# Patient Record
Sex: MALE | Race: OTHER | NOT HISPANIC OR LATINO | URBAN - METROPOLITAN AREA
[De-identification: names, ages, dates, MRNs, and addresses within clinical notes are randomized per-mention and may not be internally consistent; named-entity substitution may affect disease eponyms.]

---

## 2022-06-20 ENCOUNTER — HOSPITAL ENCOUNTER (EMERGENCY)
Facility: HOSPITAL | Age: 37
Discharge: HOME | End: 2022-06-20
Attending: FAMILY MEDICINE

## 2022-06-20 VITALS
RESPIRATION RATE: 19 BRPM | OXYGEN SATURATION: 99 % | HEIGHT: 67 IN | SYSTOLIC BLOOD PRESSURE: 140 MMHG | TEMPERATURE: 98 F | BODY MASS INDEX: 28.25 KG/M2 | HEART RATE: 78 BPM | WEIGHT: 180 LBS | DIASTOLIC BLOOD PRESSURE: 81 MMHG

## 2022-06-20 DIAGNOSIS — M79.606 LEG PAIN: Primary | ICD-10-CM

## 2022-06-20 DIAGNOSIS — M79.605 LEFT LEG PAIN: ICD-10-CM

## 2022-06-20 DIAGNOSIS — M79.604 RIGHT LEG PAIN: ICD-10-CM

## 2022-06-20 PROCEDURE — 99284 EMERGENCY DEPT VISIT MOD MDM: CPT | Mod: 25,ER

## 2022-06-20 PROCEDURE — 63600175 PHARM REV CODE 636 W HCPCS: Mod: ER | Performed by: PHYSICIAN ASSISTANT

## 2022-06-20 PROCEDURE — 96372 THER/PROPH/DIAG INJ SC/IM: CPT | Performed by: PHYSICIAN ASSISTANT

## 2022-06-20 RX ORDER — DEXAMETHASONE SODIUM PHOSPHATE 4 MG/ML
8 INJECTION, SOLUTION INTRA-ARTICULAR; INTRALESIONAL; INTRAMUSCULAR; INTRAVENOUS; SOFT TISSUE
Status: COMPLETED | OUTPATIENT
Start: 2022-06-20 | End: 2022-06-20

## 2022-06-20 RX ORDER — NAPROXEN 500 MG/1
500 TABLET ORAL 2 TIMES DAILY WITH MEALS
Qty: 12 TABLET | Refills: 0 | OUTPATIENT
Start: 2022-06-20 | End: 2024-01-16

## 2022-06-20 RX ADMIN — DEXAMETHASONE SODIUM PHOSPHATE 8 MG: 4 INJECTION, SOLUTION INTRA-ARTICULAR; INTRALESIONAL; INTRAMUSCULAR; INTRAVENOUS; SOFT TISSUE at 03:06

## 2022-06-20 NOTE — ED NOTES
Patient reports bilateral leg pain that for 2 months that has not resolved. The patient reports previous lumbar disc issue that caused pain from right lumber down the right leg. Patient reports pain he is experiencing now begins in distal right thigh and radiates to foot. On the left pain is from calf to foot. 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 He denies fall, injury or trauma. No edema or obvious trauma noted. He is awake/alert and ambulatory with mild distress noted at this time.

## 2022-06-20 NOTE — ED PROVIDER NOTES
Encounter Date: 6/20/2022       History     Chief Complaint   Patient presents with    Leg Pain     C/o bilat leg pain ongoing for a couple months. Denies any injury. Right leg pain starts in thigh and goes down leg. Left leg pain is from knee down. States pain wakes him up in his sleep.   Hx of blood clots. Pt is a .      36-year-old male presents emergency department for evaluation of intermittent bilateral leg pain.  He reports that the pain began gradually 3-6 months ago and has been intermittent since onset.  Reports that he is a  and has intermittent pain to his lower legs specially while working.  He reports that he was diagnosed with a DVT 2 years ago and is a on any blood thinning medications.  He does report 6 years ago he was also diagnosed with sciatica.  He denies any numbness, tingling, weakness or swelling to the lower extremities.  Denies any skin changes to the lower extremities.  He denies any direct trauma to his low back.  He denies any saddle anesthesia or bowel/bladder incontinence.  No treatment was attempted at home or prior to arrival today.  He denies any associated symptoms including headache, dizziness, neck pain, chest pain, shortness of breath, palpitations, nausea flank pain or dysuria.  He reports that he is here today over concern of returning DVTs.        Review of patient's allergies indicates:  No Known Allergies  Past Medical History:   Diagnosis Date    DVT (deep venous thrombosis)      History reviewed. No pertinent surgical history.  History reviewed. No pertinent family history.  Social History     Tobacco Use    Smoking status: Never Smoker    Smokeless tobacco: Never Used   Substance Use Topics    Alcohol use: Yes     Comment: occasioanlly    Drug use: Never     Review of Systems   Constitutional: Negative for activity change and appetite change.   HENT: Negative for congestion, rhinorrhea, sinus pressure, sore throat, trouble swallowing and  voice change.    Eyes: Negative for photophobia and visual disturbance.   Respiratory: Negative for cough, chest tightness and shortness of breath.    Gastrointestinal: Negative for abdominal pain, constipation, diarrhea, nausea and vomiting.   Genitourinary: Negative for decreased urine volume and flank pain.   Musculoskeletal: Positive for back pain. Negative for arthralgias, gait problem, joint swelling, neck pain and neck stiffness.   Skin: Negative for rash.   Neurological: Negative for dizziness, syncope, weakness, light-headedness, numbness and headaches.       Physical Exam     Initial Vitals [06/20/22 1347]   BP Pulse Resp Temp SpO2   (!) 142/89 77 19 98.1 °F (36.7 °C) 97 %      MAP       --         Physical Exam    Nursing note and vitals reviewed.  Constitutional: He appears well-developed and well-nourished. He is not diaphoretic. No distress.   HENT:   Head: Normocephalic and atraumatic.   Right Ear: External ear normal.   Left Ear: External ear normal.   Mouth/Throat: Oropharynx is clear and moist. No oropharyngeal exudate.   Eyes: Conjunctivae and EOM are normal. Pupils are equal, round, and reactive to light.   Neck: Neck supple.   Normal range of motion.  Cardiovascular: Normal rate, regular rhythm and normal heart sounds.   Pulmonary/Chest: Breath sounds normal. No respiratory distress. He has no wheezes. He has no rhonchi. He has no rales. He exhibits no tenderness.   Abdominal: Abdomen is soft. Bowel sounds are normal. He exhibits no distension. There is no abdominal tenderness.   No right CVA tenderness.  No left CVA tenderness. There is no guarding.   Musculoskeletal:      Cervical back: Normal range of motion and neck supple. No tenderness or bony tenderness.      Thoracic back: No tenderness or bony tenderness.      Lumbar back: No tenderness or bony tenderness.      Right hip: No tenderness or bony tenderness. Normal range of motion.      Left hip: No tenderness or bony tenderness. Normal  range of motion.      Right upper leg: Tenderness present. No swelling or bony tenderness.      Left upper leg: No swelling or tenderness.      Right knee: No swelling or bony tenderness. No tenderness.      Instability Tests: Medial Sara test negative and lateral Sara test negative.      Left knee: No swelling or bony tenderness. No tenderness.      Instability Tests: Medial Sara test negative and lateral Sara test negative.      Right lower leg: No swelling, tenderness or bony tenderness. No edema.      Left lower leg: Tenderness present. No swelling or bony tenderness. No edema.      Right ankle: No swelling. No tenderness. Normal range of motion.      Right Achilles Tendon: No tenderness.      Left ankle: No swelling. No tenderness. Normal range of motion.      Left Achilles Tendon: No tenderness.      Right foot: Normal range of motion. No tenderness or bony tenderness.      Left foot: Normal range of motion. No tenderness or bony tenderness.        Legs:      Lymphadenopathy:     He has no cervical adenopathy.   Neurological: He is alert and oriented to person, place, and time.   Skin: Skin is warm and dry.   Psychiatric: He has a normal mood and affect.         ED Course   Procedures  Labs Reviewed - No data to display       Imaging Results          US Lower Extremity Veins Bilateral (Final result)  Result time 06/20/22 15:10:01    Final result by Jose M Yuan MD (06/20/22 15:10:01)                 Impression:      No evidence of deep venous thrombosis in either lower extremity.      Electronically signed by: Jose M Yuan  Date:    06/20/2022  Time:    15:10             Narrative:    EXAMINATION:  US LOWER EXTREMITY VEINS BILATERAL    CLINICAL HISTORY:  Pain in leg, unspecified    TECHNIQUE:  Duplex and color flow Doppler and dynamic compression was performed of the bilateral lower extremity veins was performed.    COMPARISON:  None    FINDINGS:  Right thigh veins: The common femoral,  femoral, popliteal, upper greater saphenous, and deep femoral veins are patent and free of thrombus. The veins are normally compressible and have normal phasic flow and augmentation response.    Right calf veins: The visualized calf veins are patent.    Left thigh veins: The common femoral, femoral, popliteal, upper greater saphenous, and deep femoral veins are patent and free of thrombus. The veins are normally compressible and have normal phasic flow and augmentation response.    Left calf veins: The visualized calf veins are patent.    Miscellaneous: None                                 Medications   dexamethasone injection 8 mg (8 mg Intramuscular Given 6/20/22 1503)     Medical Decision Making:   Initial Assessment:   36-year-old male presents emergency department for evaluation of several month history of intermittent leg pain hand and flu back pain.  Physical exam reveals a nontoxic-appearing male in no acute distress.  Patient is afebrile vital signs within normal limits.  Neurological exam feels alert and oriented patient.  Neck is supple, nontender to palpation.  Lungs clear to auscultation bilaterally.  Abdominal exam reveals soft abdomen, nontender to palpation.  No CVA tenderness noted.  No tenderness to palpation noted over the paraspinal musculature of the spinous processes of cervical, thoracic or lumbar spine.  Examination of the lower extremities to mild tenderness to palpation noted over the right buttocks and noted to the posterior aspect of the right proximal thigh.  Mild tenderness to palpation noted over the left calf.  Full range of motion, sensation and peripheral pulses intact in lower extremities bilaterally.  Patient ambulates well without hesitation or gait abnormality.  Differential Diagnosis:   Ultrasound ordered to assess for possible serious etiology including DVT  Sciatica  Muscle strain  I carefully considered but doubt serious etiology including fracture, dislocation or cauda  equina syndrome.  No imaging of the lumbar spine indicated at this time.  ED Management:  Ultrasound reveals no evidence of DVT in either lower extremity.  Patient was given Decadron with complete relief of pain.  He reports that he is currently asymptomatic.  Instructed patient to follow up with his primary care provider for re-evaluation and to return to the emergency department immediately for any new or worsening symptoms.  Patient verbalizes understanding and agreement course of treatment.                      Clinical Impression:   Final diagnoses:  [M79.606] Leg pain (Primary)  [M79.605] Left leg pain  [M79.604] Right leg pain          ED Disposition Condition    Discharge Stable        ED Prescriptions     Medication Sig Dispense Start Date End Date Auth. Provider    naproxen (NAPROSYN) 500 MG tablet Take 1 tablet (500 mg total) by mouth 2 (two) times daily with meals. 12 tablet 6/20/2022  Ana Maria Arechiga PA-C        Follow-up Information    None          Ana Maria Arechiga PA-C  06/20/22 3582

## 2022-06-20 NOTE — ED NOTES
APPEARANCE: Pt clean and well groomed with clothes appropriately fastened. No distress is noted.    NEURO: Pt is awake and alert x3, Pt follows commands and affect is appropriate. Pt is moving all extremities well and sensation is intact. Speech is clear.    RESPIRATORY: Respirations are even and unlabored on room air. No accessory muscle use noted. Normal rate and effort is noted. Pt placed on continuous pulse ox with O2 sats noted at  98    CARDIAC: Pt placed on cardiac monitor. Sinus rhythm noted. Rate is normal. No abnormal heart sounds noted. Radial and dorsalis pedis pulses are palpable and +2. No edema noted. Cap refill is < 3    GASTRO: Pt denies abdominal pain/tenderness. States bowel movements have been regular. Bowel sounds are present and normal.     : Pt denies any pain or frequency with urination.    SKIN: Skin is warm, dry and intact. Skin color is appropriate for ethnicity. Normal skin turgor noted. Mucous membranes are moist.     MUSCULOSKELETAL: pt present to ER with c/o bilat leg pain ongoing for 2 months. Right leg pain starts in thigh and goes down to ankle. Left leg pain starts in left knee and goes down his calf. Pt does have history of DVT's. Not currently on blood thinners. Denies any chest pain or sob.

## 2022-10-19 ENCOUNTER — HOSPITAL ENCOUNTER (EMERGENCY)
Facility: HOSPITAL | Age: 37
Discharge: HOME | End: 2022-10-19
Attending: EMERGENCY MEDICINE

## 2022-10-19 VITALS
HEART RATE: 62 BPM | OXYGEN SATURATION: 99 % | WEIGHT: 180 LBS | SYSTOLIC BLOOD PRESSURE: 154 MMHG | DIASTOLIC BLOOD PRESSURE: 94 MMHG | HEIGHT: 67 IN | BODY MASS INDEX: 28.25 KG/M2 | RESPIRATION RATE: 17 BRPM | TEMPERATURE: 99 F

## 2022-10-19 DIAGNOSIS — K08.89 DENTALGIA: Primary | ICD-10-CM

## 2022-10-19 PROCEDURE — 25000003 PHARM REV CODE 250: Mod: ER | Performed by: EMERGENCY MEDICINE

## 2022-10-19 PROCEDURE — 99284 EMERGENCY DEPT VISIT MOD MDM: CPT | Mod: 25,ER

## 2022-10-19 PROCEDURE — 96372 THER/PROPH/DIAG INJ SC/IM: CPT | Performed by: EMERGENCY MEDICINE

## 2022-10-19 PROCEDURE — 63600175 PHARM REV CODE 636 W HCPCS: Mod: ER | Performed by: EMERGENCY MEDICINE

## 2022-10-19 RX ORDER — CHLORHEXIDINE GLUCONATE ORAL RINSE 1.2 MG/ML
15 SOLUTION DENTAL 2 TIMES DAILY
Qty: 473 ML | Refills: 0 | Status: SHIPPED | OUTPATIENT
Start: 2022-10-19 | End: 2022-11-02

## 2022-10-19 RX ORDER — KETOROLAC TROMETHAMINE 30 MG/ML
30 INJECTION, SOLUTION INTRAMUSCULAR; INTRAVENOUS
Status: COMPLETED | OUTPATIENT
Start: 2022-10-19 | End: 2022-10-19

## 2022-10-19 RX ORDER — AMOXICILLIN AND CLAVULANATE POTASSIUM 875; 125 MG/1; MG/1
1 TABLET, FILM COATED ORAL 2 TIMES DAILY
Qty: 9 TABLET | Refills: 0 | Status: SHIPPED | OUTPATIENT
Start: 2022-10-19 | End: 2022-10-24

## 2022-10-19 RX ORDER — AMOXICILLIN AND CLAVULANATE POTASSIUM 875; 125 MG/1; MG/1
1 TABLET, FILM COATED ORAL
Status: COMPLETED | OUTPATIENT
Start: 2022-10-19 | End: 2022-10-19

## 2022-10-19 RX ORDER — KETOROLAC TROMETHAMINE 10 MG/1
10 TABLET, FILM COATED ORAL EVERY 6 HOURS PRN
Qty: 20 TABLET | Refills: 0 | Status: SHIPPED | OUTPATIENT
Start: 2022-10-19

## 2022-10-19 RX ADMIN — AMOXICILLIN AND CLAVULANATE POTASSIUM 1 TABLET: 875; 125 TABLET, FILM COATED ORAL at 06:10

## 2022-10-19 RX ADMIN — KETOROLAC TROMETHAMINE 30 MG: 30 INJECTION, SOLUTION INTRAMUSCULAR; INTRAVENOUS at 06:10

## 2022-10-19 NOTE — ED PROVIDER NOTES
Encounter Date: 10/19/2022       History     Chief Complaint   Patient presents with    Dental Pain     To the ED with C/O dental pain to bottom molar on right side. PT states pain radiates to right ear. No swelling noted to face. PT reports takinga BC powder at 12:30 am with no relief.      Kaushik Hoyos is a 36 y.o. male who  has a past medical history of DVT (deep venous thrombosis)    The patient presents today due to right lower dental pain was started earlier this morning.  Approximately 6 hours ago he woke up with pain. He states he has never had dental pain before.  He tried taking BC Powder without relief.  His pain radiates to his jaw and ear.  No other symptoms noted. no swelling fever or other concerns.    Review of patient's allergies indicates:  No Known Allergies  Past Medical History:   Diagnosis Date    DVT (deep venous thrombosis)      History reviewed. No pertinent surgical history.  History reviewed. No pertinent family history.  Social History     Tobacco Use    Smoking status: Never    Smokeless tobacco: Never   Substance Use Topics    Alcohol use: Yes     Comment: occasioanlly    Drug use: Never     Review of Systems   Constitutional:  Negative for chills and fever.   HENT:  Positive for dental problem and ear pain. Negative for facial swelling and trouble swallowing.    Eyes:  Negative for visual disturbance.   Respiratory:  Negative for shortness of breath.    Cardiovascular:  Negative for chest pain.   Gastrointestinal:  Negative for abdominal pain.     Physical Exam     Initial Vitals [10/19/22 0600]   BP Pulse Resp Temp SpO2   (!) 154/94 62 17 98.7 °F (37.1 °C) 99 %      MAP       --         Physical Exam    Nursing note and vitals reviewed.  Constitutional: He appears well-developed and well-nourished. No distress.   HENT:   Head: Normocephalic and atraumatic.   Poor dentition  to right lower molars.  No obvious abscess.  No submental swelling or induration.  Full range of motion of the  neck.    Right TM clear, no erythema tenderness   Eyes: Conjunctivae are normal.   Neck: No tracheal deviation present.   Cardiovascular:  Regular rhythm and intact distal pulses.           Pulmonary/Chest: No stridor.     Lymphadenopathy:     He has no cervical adenopathy.   Neurological: He is alert.   Skin: Skin is warm and dry. Capillary refill takes less than 2 seconds. No rash noted.   Psychiatric: He has a normal mood and affect.       ED Course   Procedures  Labs Reviewed - No data to display       Imaging Results    None          Medications   ketorolac injection 30 mg (30 mg Intramuscular Given 10/19/22 0619)   amoxicillin-clavulanate 875-125mg per tablet 1 tablet (1 tablet Oral Given 10/19/22 0619)     Medical Decision Making:   Differential Diagnosis:   Differential Diagnosis includes, but is not limited to:  Sam's angina, acute necrotizing ulcerative gingivitis, epiglottitis, parotitis, gingival abscess, facial cellulitis, peritonsillar/retropharyngeal abscess, sialolithiasis, periapical abscess, pulpitis, dental fracture, dental caries, aphthous ulcer.    ED Management:  36 presents in with acute dental pain.  His vital signs are stable.  No obvious abscess or signs of Sam's or emergent process today.  She is symptomatically, Augmentin, recommended follow-up with a dentist.  Return precautions discussed.    After taking into careful account the historical factors and physical exam findings of the patient's presentation today, in conjunction with the empirical and objective data obtained on ED workup, no acute emergent medical condition has been identified. The patient appears to be low risk for an emergent medical condition and I feel it is safe and appropriate at this time for the patient to be discharged to follow-up as detailed in their discharge instructions for reevaluation and possible continued outpatient workup and management. I have discussed the specifics of the workup with the patient  and the patient has verbalized understanding of the details of the workup, the diagnosis, the treatment plan, and the need for outpatient follow-up.  Although the patient has no emergent etiology today this does not preclude the development of an emergent condition so in addition, I have advised the patient that they can return to the ED and/or activate EMS at any time with worsening of their symptoms, change of their symptoms, or with any other medical complaint.  The patient remained comfortable and stable during their visit in the ED.  Discharge and follow-up instructions discussed with the patient who expressed understanding and willingness to comply with my recommendations.                          Clinical Impression:   Final diagnoses:  [K08.89] Dentalgia (Primary)        ED Disposition Condition    Discharge Stable          ED Prescriptions       Medication Sig Dispense Start Date End Date Auth. Provider    ketorolac (TORADOL) 10 mg tablet Take 1 tablet (10 mg total) by mouth every 6 (six) hours as needed. 20 tablet 10/19/2022 -- Matteo Boyd Jr., MD    chlorhexidine (PERIDEX) 0.12 % solution Use as directed 15 mLs in the mouth or throat 2 (two) times daily. Swish and spit twice daily for sore throat as needed for 14 days 473 mL 10/19/2022 11/2/2022 Matteo Boyd Jr., MD    amoxicillin-clavulanate 875-125mg (AUGMENTIN) 875-125 mg per tablet Take 1 tablet by mouth 2 (two) times daily. for 5 days 9 tablet 10/19/2022 10/24/2022 Matteo Boyd Jr., MD          Follow-up Information    None         Portions of this note were dictated using voice recognition software and may contain dictation related errors in spelling/grammar/syntax not found on text review       Matteo Boyd Jr., MD  10/19/22 0644

## 2024-01-16 ENCOUNTER — HOSPITAL ENCOUNTER (EMERGENCY)
Facility: HOSPITAL | Age: 39
Discharge: HOME | End: 2024-01-16
Attending: EMERGENCY MEDICINE

## 2024-01-16 VITALS
WEIGHT: 170 LBS | HEIGHT: 68 IN | SYSTOLIC BLOOD PRESSURE: 137 MMHG | RESPIRATION RATE: 18 BRPM | OXYGEN SATURATION: 96 % | TEMPERATURE: 98 F | DIASTOLIC BLOOD PRESSURE: 90 MMHG | HEART RATE: 56 BPM | BODY MASS INDEX: 25.76 KG/M2

## 2024-01-16 DIAGNOSIS — M62.830 SPASM OF MUSCLE OF LOWER BACK: Primary | ICD-10-CM

## 2024-01-16 DIAGNOSIS — M54.50 ACUTE RIGHT-SIDED LOW BACK PAIN, UNSPECIFIED WHETHER SCIATICA PRESENT: ICD-10-CM

## 2024-01-16 PROCEDURE — 96372 THER/PROPH/DIAG INJ SC/IM: CPT

## 2024-01-16 PROCEDURE — 99284 EMERGENCY DEPT VISIT MOD MDM: CPT | Mod: ER

## 2024-01-16 PROCEDURE — 63600175 PHARM REV CODE 636 W HCPCS: Mod: ER

## 2024-01-16 PROCEDURE — 25000003 PHARM REV CODE 250: Mod: ER

## 2024-01-16 RX ORDER — METHOCARBAMOL 500 MG/1
500 TABLET, FILM COATED ORAL
Status: COMPLETED | OUTPATIENT
Start: 2024-01-16 | End: 2024-01-16

## 2024-01-16 RX ORDER — KETOROLAC TROMETHAMINE 30 MG/ML
15 INJECTION, SOLUTION INTRAMUSCULAR; INTRAVENOUS
Status: COMPLETED | OUTPATIENT
Start: 2024-01-16 | End: 2024-01-16

## 2024-01-16 RX ORDER — METHOCARBAMOL 750 MG/1
750 TABLET, FILM COATED ORAL 4 TIMES DAILY
Qty: 20 TABLET | Refills: 0 | Status: SHIPPED | OUTPATIENT
Start: 2024-01-16 | End: 2024-01-21

## 2024-01-16 RX ORDER — LIDOCAINE 50 MG/G
1 PATCH TOPICAL
Status: DISCONTINUED | OUTPATIENT
Start: 2024-01-16 | End: 2024-01-16 | Stop reason: HOSPADM

## 2024-01-16 RX ORDER — LIDOCAINE 50 MG/G
1 PATCH TOPICAL DAILY
Qty: 7 PATCH | Refills: 0 | Status: SHIPPED | OUTPATIENT
Start: 2024-01-16

## 2024-01-16 RX ORDER — NAPROXEN 500 MG/1
500 TABLET ORAL 2 TIMES DAILY WITH MEALS
Qty: 30 TABLET | Refills: 0 | Status: SHIPPED | OUTPATIENT
Start: 2024-01-16

## 2024-01-16 RX ADMIN — KETOROLAC TROMETHAMINE 15 MG: 30 INJECTION INTRAMUSCULAR; INTRAVENOUS at 02:01

## 2024-01-16 RX ADMIN — METHOCARBAMOL TABLETS 500 MG: 500 TABLET, COATED ORAL at 02:01

## 2024-01-16 RX ADMIN — LIDOCAINE 1 PATCH: 50 PATCH CUTANEOUS at 02:01

## 2024-01-16 NOTE — DISCHARGE INSTRUCTIONS
Use naproxen twice a day to help with pain and inflammation. You can take tylenol too. Robaxin is a muscle relaxer.     Use hot/cold compress.
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
## 2024-08-06 ENCOUNTER — HOSPITAL ENCOUNTER (EMERGENCY)
Facility: HOSPITAL | Age: 39
Discharge: HOME | End: 2024-08-06
Attending: EMERGENCY MEDICINE
Payer: COMMERCIAL

## 2024-08-06 VITALS
OXYGEN SATURATION: 97 % | TEMPERATURE: 99 F | BODY MASS INDEX: 26.52 KG/M2 | SYSTOLIC BLOOD PRESSURE: 145 MMHG | RESPIRATION RATE: 18 BRPM | HEART RATE: 76 BPM | WEIGHT: 175 LBS | DIASTOLIC BLOOD PRESSURE: 93 MMHG | HEIGHT: 68 IN

## 2024-08-06 DIAGNOSIS — M79.644 FINGER PAIN, RIGHT: Primary | ICD-10-CM

## 2024-08-06 PROCEDURE — 99283 EMERGENCY DEPT VISIT LOW MDM: CPT | Mod: 25,ER

## 2024-08-06 RX ORDER — NAPROXEN 500 MG/1
500 TABLET ORAL 2 TIMES DAILY WITH MEALS
Qty: 10 TABLET | Refills: 0 | Status: SHIPPED | OUTPATIENT
Start: 2024-08-06

## 2024-08-07 NOTE — ED PROVIDER NOTES
Encounter Date: 8/6/2024       History     Chief Complaint   Patient presents with    Finger Pain     Reports to ED c c/o injury to R 4th finger. States jammed finger about 3 months ago. Had neg xray done about 1 month ago. Reports continued pain and swelling. No obvious deformity noted. Mild swelling noted. No meds taken PTA     Patient presents with pain and edema to the right ring finger specifically at the PIP joint.  States that about 3 months ago jammed it in [place].  About 1 month ago he had an x-ray performed at an outpatient center from his primary care provider and was told that there were no acute findings seen and no further treatment was given.  He continues to have pain to the area.  He denies any fevers/chills.      The history is provided by the patient and a friend.     Review of patient's allergies indicates:  No Known Allergies  Past Medical History:   Diagnosis Date    DVT (deep venous thrombosis)      History reviewed. No pertinent surgical history.  No family history on file.  Social History     Tobacco Use    Smoking status: Never    Smokeless tobacco: Never   Substance Use Topics    Alcohol use: Yes     Comment: occasioanlly    Drug use: Never     Review of Systems   Musculoskeletal:         Finger pain       Physical Exam     Initial Vitals [08/06/24 1946]   BP Pulse Resp Temp SpO2   (!) 145/93 76 18 98.5 °F (36.9 °C) 97 %      MAP       --         Physical Exam    Musculoskeletal:      Comments: Pulses are 2+ to the right upper extremity, there is no obvious deformity noted to the finger there is no tenderness to palpation to the flexor or extensor tendon area there is no tenderness to palpation with passive extension, there is edema noted to the PIP joint, there is no unusual erythema or warmth noted           ED Course   Procedures  Labs Reviewed - No data to display       Imaging Results              X-Ray Finger 2 or More Views Right (Final result)  Result time 08/06/24 20:50:47       Final result by Naty Olivarez MD (08/06/24 20:50:47)                   Impression:      Three-view exam    Soft tissue edema present.  No fracture or dislocation seen 4th digit.  Nondisplaced fracture tuft distal phalanx 3rd digit.      Electronically signed by: Naty Olivarez  Date:    08/06/2024  Time:    20:50               Narrative:    EXAMINATION:  XR FINGER 2 OR MORE VIEWS RIGHT    CLINICAL HISTORY:  ring finger pain and edema at PIP joint;                                       Medications - No data to display  Medical Decision Making  Referral placed to hand surgery, instructed to return immediately for any new or worsening symptoms and he verbalized understanding.    Amount and/or Complexity of Data Reviewed  Radiology: ordered. Decision-making details documented in ED Course.    Risk  Prescription drug management.  Risk Details: Differential diagnosis includes but is not limited to:  Fracture, dislocation, sprain, strain, tenosynovitis               ED Course as of 08/10/24 0007   Tue Aug 06, 2024   2057 X-Ray Finger 2 or More Views Right  Soft tissue edema present.  No fracture or dislocation seen 4th digit.  Nondisplaced fracture tuft distal phalanx 3rd digit.  Patient has no pain, tenderness to palpation, edema, or open sores to the 3rd digit [CD]      ED Course User Index  [CD] Suhk East DO                           Clinical Impression:  Final diagnoses:  [M79.644] Finger pain, right (Primary)          ED Disposition Condition    Discharge           ED Prescriptions       Medication Sig Dispense Start Date End Date Auth. Provider    naproxen (NAPROSYN) 500 MG tablet Take 1 tablet (500 mg total) by mouth 2 (two) times daily with meals. 10 tablet 8/6/2024 -- Sukh East DO          Follow-up Information       Follow up With Specialties Details Why Contact Info Additional Information    [facility] Schedule an appointment as soon as  possible for a visit   [address], [address]  [place] [address] [phone] Please park in [address] or D and use [employer]. [facility] Medical Office Bldg. elevators.             Sukh East, DO  08/10/24 0008

## 2024-08-07 NOTE — ED NOTES
APPEARANCE: Alert, oriented and in no acute distress.  CARDIAC: Normal rate. Pt denies chest pain.   PERIPHERAL VASCULAR: Normal cap refill. No edema. Warm to touch.    RESPIRATORY: Respirations are even and unlabored. Normal rate. Pt denies SOB. Symmetrical chest expansion bilaterally. No obvious signs of distress.  GASTRO: Abdomen soft, non-tender, no distention.  GENITOURINARY: voids spontaneously without difficulty.   MUSC: Full ROM. No bony tenderness or soft tissue tenderness. No obvious deformity.  SKIN: Skin is warm and dry.  NEURO: Maria Luisa coma scale: eyes open spontaneously-4, oriented & converses-5, obeys commands-6. No neurological abnormalities.   HEENT: WDL.  MENTAL STATUS: Awake, alert and aware of environment.

## 2024-10-04 ENCOUNTER — TELEPHONE (OUTPATIENT)
Dept: ORTHOPEDICS | Facility: CLINIC | Age: 39
End: 2024-10-04
Payer: COMMERCIAL

## 2024-10-04 DIAGNOSIS — M79.644 PAIN OF FINGER OF RIGHT HAND: Primary | ICD-10-CM

## 2025-07-07 ENCOUNTER — HOSPITAL ENCOUNTER (EMERGENCY)
Facility: HOSPITAL | Age: 40
Discharge: HOME | End: 2025-07-07
Attending: FAMILY MEDICINE
Payer: COMMERCIAL

## 2025-07-07 VITALS
DIASTOLIC BLOOD PRESSURE: 79 MMHG | TEMPERATURE: 98 F | HEART RATE: 64 BPM | SYSTOLIC BLOOD PRESSURE: 141 MMHG | RESPIRATION RATE: 20 BRPM | HEIGHT: 68 IN | WEIGHT: 180 LBS | BODY MASS INDEX: 27.28 KG/M2 | OXYGEN SATURATION: 96 %

## 2025-07-07 DIAGNOSIS — M54.9 BACK PAIN: ICD-10-CM

## 2025-07-07 DIAGNOSIS — S16.1XXA STRAIN OF NECK MUSCLE, INITIAL ENCOUNTER: Primary | ICD-10-CM

## 2025-07-07 LAB
ABSOLUTE EOSINOPHIL (OHS): 0.26 K/UL
ABSOLUTE MONOCYTE (OHS): 0.57 K/UL (ref 0.3–1)
ABSOLUTE NEUTROPHIL COUNT (OHS): 4.41 K/UL (ref 1.8–7.7)
ANION GAP (OHS): 9 MMOL/L (ref 8–16)
BASOPHILS # BLD AUTO: 0.04 K/UL
BASOPHILS NFR BLD AUTO: 0.5 %
BUN SERPL-MCNC: 17 MG/DL (ref 2–20)
CALCIUM SERPL-MCNC: 8.6 MG/DL (ref 8.7–10.5)
CHLORIDE SERPL-SCNC: 107 MMOL/L (ref 95–110)
CO2 SERPL-SCNC: 24 MMOL/L (ref 23–29)
CREAT SERPL-MCNC: 1.2 MG/DL (ref 0.5–1.4)
D DIMER PPP IA.FEU-MCNC: 0.24 MG/L FEU
ERYTHROCYTE [DISTWIDTH] IN BLOOD BY AUTOMATED COUNT: 12.7 % (ref 11.5–14.5)
GFR SERPLBLD CREATININE-BSD FMLA CKD-EPI: >60 ML/MIN/1.73/M2
GLUCOSE SERPL-MCNC: 98 MG/DL (ref 70–110)
HCT VFR BLD AUTO: 45.6 % (ref 40–54)
HGB BLD-MCNC: 15.2 GM/DL (ref 14–18)
IMM GRANULOCYTES # BLD AUTO: 0.02 K/UL (ref 0–0.04)
IMM GRANULOCYTES NFR BLD AUTO: 0.3 % (ref 0–0.5)
LYMPHOCYTES # BLD AUTO: 2.36 K/UL (ref 1–4.8)
MCH RBC QN AUTO: 29.4 PG (ref 27–31)
MCHC RBC AUTO-ENTMCNC: 33.3 G/DL (ref 32–36)
MCV RBC AUTO: 88 FL (ref 82–98)
NUCLEATED RBC (/100WBC) (OHS): 0 /100 WBC
OHS QRS DURATION: 88 MS
OHS QTC CALCULATION: 419 MS
PLATELET # BLD AUTO: 170 K/UL (ref 150–450)
PMV BLD AUTO: 12.6 FL (ref 9.2–12.9)
POTASSIUM SERPL-SCNC: 4.2 MMOL/L (ref 3.5–5.1)
RBC # BLD AUTO: 5.17 M/UL (ref 4.6–6.2)
RELATIVE EOSINOPHIL (OHS): 3.4 %
RELATIVE LYMPHOCYTE (OHS): 30.8 % (ref 18–48)
RELATIVE MONOCYTE (OHS): 7.4 % (ref 4–15)
RELATIVE NEUTROPHIL (OHS): 57.6 % (ref 38–73)
SODIUM SERPL-SCNC: 140 MMOL/L (ref 136–145)
TROPONIN I SERPL DL<=0.01 NG/ML-MCNC: 0.01 NG/ML (ref 0–0.03)
WBC # BLD AUTO: 7.66 K/UL (ref 3.9–12.7)

## 2025-07-07 PROCEDURE — 93005 ELECTROCARDIOGRAM TRACING: CPT | Mod: ER

## 2025-07-07 PROCEDURE — 63600175 PHARM REV CODE 636 W HCPCS: Mod: JZ,TB,ER | Performed by: PHYSICIAN ASSISTANT

## 2025-07-07 PROCEDURE — 85379 FIBRIN DEGRADATION QUANT: CPT | Mod: ER | Performed by: PHYSICIAN ASSISTANT

## 2025-07-07 PROCEDURE — 99900035 HC TECH TIME PER 15 MIN (STAT): Mod: ER

## 2025-07-07 PROCEDURE — 99285 EMERGENCY DEPT VISIT HI MDM: CPT | Mod: 25,ER

## 2025-07-07 PROCEDURE — 93010 ELECTROCARDIOGRAM REPORT: CPT | Mod: ,,, | Performed by: STUDENT IN AN ORGANIZED HEALTH CARE EDUCATION/TRAINING PROGRAM

## 2025-07-07 PROCEDURE — 96374 THER/PROPH/DIAG INJ IV PUSH: CPT | Mod: ER

## 2025-07-07 PROCEDURE — 84484 ASSAY OF TROPONIN QUANT: CPT | Mod: ER | Performed by: PHYSICIAN ASSISTANT

## 2025-07-07 PROCEDURE — 80051 ELECTROLYTE PANEL: CPT | Mod: ER | Performed by: PHYSICIAN ASSISTANT

## 2025-07-07 PROCEDURE — 85025 COMPLETE CBC W/AUTO DIFF WBC: CPT | Mod: ER | Performed by: PHYSICIAN ASSISTANT

## 2025-07-07 RX ORDER — LIDOCAINE 50 MG/G
1 PATCH TOPICAL DAILY
Qty: 5 PATCH | Refills: 0 | Status: SHIPPED | OUTPATIENT
Start: 2025-07-07

## 2025-07-07 RX ORDER — NAPROXEN 500 MG/1
500 TABLET ORAL 2 TIMES DAILY WITH MEALS
Qty: 60 TABLET | Refills: 0 | Status: SHIPPED | OUTPATIENT
Start: 2025-07-07

## 2025-07-07 RX ORDER — KETOROLAC TROMETHAMINE 30 MG/ML
15 INJECTION, SOLUTION INTRAMUSCULAR; INTRAVENOUS
Status: COMPLETED | OUTPATIENT
Start: 2025-07-07 | End: 2025-07-07

## 2025-07-07 RX ORDER — METHOCARBAMOL 500 MG/1
500 TABLET, FILM COATED ORAL 3 TIMES DAILY
Qty: 15 TABLET | Refills: 0 | Status: SHIPPED | OUTPATIENT
Start: 2025-07-07 | End: 2025-07-12

## 2025-07-07 RX ADMIN — KETOROLAC TROMETHAMINE 15 MG: 30 INJECTION, SOLUTION INTRAMUSCULAR; INTRAVENOUS at 02:07

## 2025-07-07 NOTE — DISCHARGE INSTRUCTIONS

## 2025-07-07 NOTE — ED PROVIDER NOTES
Encounter Date: 7/7/2025       History     Chief Complaint   Patient presents with    Back Pain     Right upper back pain that started this morning. Denies injury.      39-year-old male, PMH DVT, presents to ED with concern of right-sided upper back pain that began this morning.  No injury or trauma.  He does report he works as a  and does drive long distances on daily basis.  Pain symptoms are sharp, worse with touch or movement, do worsened with deep breath or during a cough, severity 9/10.  He has not taken any medications for his symptoms.  Denying chest pain, cough, shortness of breath, fevers or chills, numbness or focal weakness.  No other acute complaints at this time    The history is provided by the patient.     Review of patient's allergies indicates:  No Known Allergies  Past Medical History:   Diagnosis Date    DVT (deep venous thrombosis)      History reviewed. No pertinent surgical history.  No family history on file.  Social History[1]  Review of Systems   Constitutional:  Negative for chills and fever.   HENT:  Negative for sore throat.    Respiratory:  Negative for cough and shortness of breath.    Cardiovascular:  Negative for chest pain.   Gastrointestinal:  Negative for diarrhea, nausea and vomiting.   Musculoskeletal:  Positive for back pain. Negative for neck stiffness.   Neurological:  Negative for weakness, numbness and headaches.       Physical Exam     Initial Vitals [07/07/25 1248]   BP Pulse Resp Temp SpO2   (!) 153/92 79 17 98.2 °F (36.8 °C) 96 %      MAP       --         Physical Exam    Vitals reviewed.  Constitutional: He appears well-developed and well-nourished. He is active. He does not have a sickly appearance. He does not appear ill. No distress.   HENT:   Head: Normocephalic and atraumatic.   Neck:   Tenderness to right trapezius extending towards right rhomboid.  No midline cervical or thoracic tenderness noted.  No acute skin changes.   Normal range of  motion.  Cardiovascular:  Normal rate and regular rhythm.           Pulmonary/Chest: Effort normal and breath sounds normal.   Musculoskeletal:      Cervical back: Normal range of motion.     Neurological: He is alert. GCS eye subscore is 4. GCS verbal subscore is 5. GCS motor subscore is 6.   Skin: Skin is warm and dry.   Psychiatric: He has a normal mood and affect. His speech is normal and behavior is normal.         ED Course   Procedures  Labs Reviewed   BASIC METABOLIC PANEL - Abnormal       Result Value    Sodium 140      Potassium 4.2      Chloride 107      CO2 24      Glucose 98      BUN 17      Creatinine 1.2      Calcium 8.6 (*)     Anion Gap 9      eGFR >60     TROPONIN I - Normal    Troponin-I 0.013     D DIMER, QUANTITATIVE - Normal    D-Dimer 0.24     CBC WITH DIFFERENTIAL - Normal    WBC 7.66      RBC 5.17      HGB 15.2      HCT 45.6      MCV 88      MCH 29.4      MCHC 33.3      RDW 12.7      Platelet Count 170      MPV 12.6      Nucleated RBC 0      Neut % 57.6      Lymph % 30.8      Mono % 7.4      Eos % 3.4      Basophil % 0.5      Imm Grans % 0.3      Neut # 4.41      Lymph # 2.36      Mono # 0.57      Eos # 0.26      Baso # 0.04      Imm Grans # 0.02     CBC W/ AUTO DIFFERENTIAL    Narrative:     The following orders were created for panel order CBC auto differential.  Procedure                               Abnormality         Status                     ---------                               -----------         ------                     CBC with Differential[[id number]]        Normal              Final result                 Please view results for these tests on the individual orders.        ECG Results              EKG 12-lead (In process)        Collection Time Result Time QRS Duration OHS QTC Calculation    07/07/25 13:38:48 07/07/25 13:51:44 88 419                     In process by Interface, Lab In [facility] (07/07/25 13:51:48)                   Narrative:    Test Reason : M54.9,    Vent.  Rate :  63 BPM     Atrial Rate :  63 BPM     P-R Int : 136 ms          QRS Dur :  88 ms      QT Int : 410 ms       P-R-T Axes :  32  75  43 degrees    QTcB Int : 419 ms    Normal sinus rhythm with sinus arrhythmia  Normal ECG  No previous ECGs available    Referred By: AAAREFERRAL SELF           Confirmed By:                                   Imaging Results              X-Ray Chest 1 View (Final result)  Result time 07/07/25 13:36:08      Final result by JUAN A Ramírez Sr., MD (07/07/25 13:36:08)                   Impression:      Normal study.      Electronically signed by: Killian Ramírez MD  Date:    07/07/2025  Time:    13:36               Narrative:    EXAMINATION:  XR CHEST 1 VIEW    CLINICAL HISTORY:  Chest pain, unspecified    COMPARISON:  None    FINDINGS:  The size of the heart is normal. The lungs are clear. There is no pneumothorax.  The costophrenic angles are sharp.                                       Medications   ketorolac injection 15 mg (has no administration in time range)     Medical Decision Making  Patient presents with concern of right-sided upper back pain that began this morning.  No injury or trauma.  Pain symptoms worse with movement and with deep breath.  Afebrile.  Patient in no distress on exam    DDx:  Including but not limited to strain, sprain, spasm, radiculopathy, neuropathy, less likely ACS/PE, pneumothorax, pneumonia    Amount and/or Complexity of Data Reviewed  Labs: ordered. Decision-making details documented in ED Course.  Radiology: ordered. Decision-making details documented in ED Course.  ECG/medicine tests: ordered.     Details: EKG NSR, rate 63, no ST-elevation.  EKG reviewed by attending, Dr. Garcia    Risk  Prescription drug management.               ED Course as of 07/07/25 1442   Mon Jul 07, 2025   1427 CBC auto differential  WNL [KS]   1427 Basic metabolic panel(!)  Unremarkable [KS]   1427 D dimer, quantitative  WNL [KS]   1427 Troponin I  WNL [KS]   1427  X-Ray Chest 1 View  No acute findings per Radiology review [KS]   1437 Patient continues to rest comfortably on re-evaluation.  With careful consideration, I do have high suspicion his presenting pain symptoms are musculoskeletal as pain symptoms are worse with touch or movement but alleviated with rest.  Very low suspicion for ACS or PE given today's findings.  Will give Toradol in ED along with prescriptions as written below.  Encouraged ice/heat, stretches and movements as tolerated and close outpatient follow-up for re-evaluation.  ED return precautions were discussed.  Patient states understanding and agrees with plan [KS]      ED Course User Index  [KS] Leodan Kline PA-C                           Clinical Impression:  Final diagnoses:  [M54.9] Back pain  [S16.1XXA] Strain of neck muscle, initial encounter (Primary)          ED Disposition Condition    Discharge Stable          ED Prescriptions       Medication Sig Dispense Start Date End Date Auth. Provider    naproxen (NAPROSYN) 500 MG tablet Take 1 tablet (500 mg total) by mouth 2 (two) times daily with meals. 60 tablet 7/7/2025 -- Leodan Kline PA-C    LIDOcaine (LIDODERM) 5 % Place 1 patch onto the skin once daily. Remove & Discard patch within 12 hours or as directed by MD 5 patch 7/7/2025 -- Leodan Kline PA-C    methocarbamoL (ROBAXIN) 500 MG Tab Take 1 tablet (500 mg total) by mouth 3 (three) times daily. for 5 days 15 tablet 7/7/2025 7/12/2025 Leodan Kline PA-C          Follow-up Information       Follow up With Specialties Details Why Contact Info    Your Doctor                       [1]   Social History  Tobacco Use    Smoking status: Every Day     Types: Cigarettes    Smokeless tobacco: Never   Substance Use Topics    Alcohol use: Yes     Comment: occasioanlly    Drug use: Never        Leodan Kline PA-C  07/07/25 5386